# Patient Record
Sex: FEMALE | URBAN - METROPOLITAN AREA
[De-identification: names, ages, dates, MRNs, and addresses within clinical notes are randomized per-mention and may not be internally consistent; named-entity substitution may affect disease eponyms.]

---

## 2021-07-16 ENCOUNTER — APPOINTMENT (OUTPATIENT)
Dept: URBAN - METROPOLITAN AREA SURGERY 8 | Age: 26
Setting detail: DERMATOLOGY
End: 2021-07-17

## 2021-07-16 DIAGNOSIS — R21 RASH AND OTHER NONSPECIFIC SKIN ERUPTION: ICD-10-CM

## 2021-07-16 PROBLEM — D23.61 OTHER BENIGN NEOPLASM OF SKIN OF RIGHT UPPER LIMB, INCLUDING SHOULDER: Status: ACTIVE | Noted: 2021-07-16

## 2021-07-16 PROCEDURE — OTHER ADDITIONAL NOTES: OTHER

## 2021-07-16 PROCEDURE — 99203 OFFICE O/P NEW LOW 30 MIN: CPT

## 2021-07-16 PROCEDURE — OTHER DEFER: OTHER

## 2021-07-16 PROCEDURE — OTHER COUNSELING: OTHER

## 2021-07-16 ASSESSMENT — LOCATION ZONE DERM: LOCATION ZONE: ARM

## 2021-07-16 ASSESSMENT — LOCATION SIMPLE DESCRIPTION DERM: LOCATION SIMPLE: LEFT FOREARM

## 2021-07-16 ASSESSMENT — LOCATION DETAILED DESCRIPTION DERM: LOCATION DETAILED: LEFT VENTRAL DISTAL FOREARM

## 2021-07-16 NOTE — PROCEDURE: DEFER
Detail Level: Simple
Procedure To Be Performed At Next Visit: Biopsy by punch method
Introduction Text (Please End With A Colon): The following procedure was deferred:
Instructions (Optional): will work in day of when she calls with flare

## 2021-07-16 NOTE — PROCEDURE: ADDITIONAL NOTES
Detail Level: Simple
Render Risk Assessment In Note?: no
Additional Notes: current lesions appear to be resolving (she woke up them) and concern that biopsy could be non-diagnostic. recommended deferral until rash flares and appears similar to that on chest in photos and will re-evaluate/work in day of and likely biopsy.\\n\\nddx includes tumid lupus vs urticaria (transient and edematous to indurated in photos) vs less likely LCV vs GA vs other. offered more potent topical steroid but she declined

## 2021-07-16 NOTE — HPI: RASH
What Type Of Note Output Would You Prefer (Optional)?: Standard Output
How Severe Is Your Rash?: mild
Is This A New Presentation, Or A Follow-Up?: Rash
Additional History: Reports she has had rash since she was in 7th grade and presents as itchy red lesions that last 24 hours or less before resolving